# Patient Record
Sex: FEMALE | Race: WHITE | NOT HISPANIC OR LATINO | Employment: FULL TIME | URBAN - METROPOLITAN AREA
[De-identification: names, ages, dates, MRNs, and addresses within clinical notes are randomized per-mention and may not be internally consistent; named-entity substitution may affect disease eponyms.]

---

## 2024-06-26 ENCOUNTER — TELEPHONE (OUTPATIENT)
Dept: OBSTETRICS AND GYNECOLOGY | Facility: CLINIC | Age: 54
End: 2024-06-26
Payer: COMMERCIAL

## 2024-06-26 NOTE — TELEPHONE ENCOUNTER
----- Message from Kitty Esteban sent at 6/26/2024  3:20 PM CDT -----  Regarding: Appointment  Contact: patient  Per phone call with patient, she stated that she would like to schedule an appointment with the physician to establish care.  Please return call at [phone] (home).    Thanks,  SJ

## 2024-06-28 ENCOUNTER — OFFICE VISIT (OUTPATIENT)
Dept: OBSTETRICS AND GYNECOLOGY | Facility: CLINIC | Age: 54
End: 2024-06-28
Payer: COMMERCIAL

## 2024-06-28 VITALS
SYSTOLIC BLOOD PRESSURE: 119 MMHG | BODY MASS INDEX: 27.94 KG/M2 | WEIGHT: 148 LBS | HEIGHT: 61 IN | DIASTOLIC BLOOD PRESSURE: 76 MMHG | HEART RATE: 94 BPM

## 2024-06-28 DIAGNOSIS — A60.9 HSV (HERPES SIMPLEX VIRUS) ANOGENITAL INFECTION: Primary | ICD-10-CM

## 2024-06-28 DIAGNOSIS — N90.7 LABIAL CYST: ICD-10-CM

## 2024-06-28 DIAGNOSIS — R35.0 URINARY FREQUENCY: ICD-10-CM

## 2024-06-28 LAB
BILIRUB SERPL-MCNC: NEGATIVE MG/DL
BLOOD URINE, POC: NEGATIVE
CLARITY, POC UA: CLEAR
COLOR, POC UA: COLORLESS
GLUCOSE UR QL STRIP: NEGATIVE
KETONES UR QL STRIP: NEGATIVE
LEUKOCYTE ESTERASE URINE, POC: NEGATIVE
NITRITE, POC UA: NEGATIVE
PH, POC UA: 6.5
PROTEIN, POC: NEGATIVE
SPECIFIC GRAVITY, POC UA: <=1.005
UROBILINOGEN, POC UA: 0.2

## 2024-06-28 RX ORDER — CLOTRIMAZOLE AND BETAMETHASONE DIPROPIONATE 10; .64 MG/G; MG/G
CREAM TOPICAL 2 TIMES DAILY
Qty: 45 G | Refills: 0 | Status: SHIPPED | OUTPATIENT
Start: 2024-06-28 | End: 2024-07-05

## 2024-06-28 RX ORDER — MUPIROCIN 20 MG/G
OINTMENT TOPICAL 2 TIMES DAILY
COMMUNITY
Start: 2024-06-07

## 2024-06-28 RX ORDER — VALACYCLOVIR HYDROCHLORIDE 500 MG/1
500 TABLET, FILM COATED ORAL
COMMUNITY
Start: 2024-06-20 | End: 2024-06-28 | Stop reason: SDUPTHER

## 2024-06-28 RX ORDER — CLOBETASOL PROPIONATE 0.5 MG/G
AEROSOL, FOAM TOPICAL
COMMUNITY
Start: 2024-02-09

## 2024-06-28 RX ORDER — PROGESTERONE 100 MG/1
CAPSULE ORAL
COMMUNITY
Start: 2024-06-26

## 2024-06-28 RX ORDER — PANTOPRAZOLE SODIUM 40 MG/1
TABLET, DELAYED RELEASE ORAL
COMMUNITY
Start: 2024-05-26

## 2024-06-28 RX ORDER — SULFAMETHOXAZOLE AND TRIMETHOPRIM 800; 160 MG/1; MG/1
60 TABLET ORAL
COMMUNITY
Start: 2024-06-07

## 2024-06-28 RX ORDER — CLINDAMYCIN HYDROCHLORIDE 300 MG/1
300 CAPSULE ORAL 3 TIMES DAILY
Qty: 21 CAPSULE | Refills: 0 | Status: SHIPPED | OUTPATIENT
Start: 2024-06-28 | End: 2024-07-05

## 2024-06-28 RX ORDER — VALACYCLOVIR HYDROCHLORIDE 500 MG/1
500 TABLET, FILM COATED ORAL DAILY
Qty: 90 TABLET | Refills: 3 | Status: SHIPPED | OUTPATIENT
Start: 2024-06-28 | End: 2025-06-28

## 2024-06-28 NOTE — PROGRESS NOTES
"Subjective     Patient ID: Shireen Haas is a 54 y.o. female.    Chief Complaint:  Urinary Frequency      History of Present Illness  Urinary Frequency   Associated symptoms include frequency. Pertinent negatives include no chills, flank pain, hematuria, nausea, urgency, vomiting, constipation or rash.     Patient presents to the clinic today to establish care.  She does report that today she has been experiencing urinary frequency without burning.  She reports voiding 8 times so far today.  She denies back pain, dysuria, hematuria, or fever.  She reports that she does have a history of HSV and has recently been going through stress with her son leaving for the  and has had a breakout.  She is currently being treated and would like to get back on suppressive therapy.  Patient also reports that a few days ago she noticed an area of irritation on the external labia that felt swollen and had a blackhead on it.  It is substantially smaller today but she would like to have it checked out.  She will be due for her wellness exam this September.    Outpatient Medications Marked as Taking for the 6/28/24 encounter (Office Visit) with Rekha Polanco NP   Medication Sig Dispense Refill    ARMOUR THYROID 60 mg Tab Take 60 mg by mouth.      clobetasoL (OLUX) 0.05 % Foam       L-LYSINE ORAL Take by mouth.      mupirocin (BACTROBAN) 2 % ointment 2 (two) times daily. Apply to affected area      pantoprazole (PROTONIX) 40 MG tablet TAKE 1 TABLET BY MOUTH ONCE A DAY TAKE ON EMPTY STOMACH 30 MINUTES PRIOR TO FIRST MEAL OF THE DAY      progesterone (PROMETRIUM) 100 MG capsule       testosterone 100 mg pellet Inject 100 mg into the muscle.      [DISCONTINUED] valACYclovir (VALTREX) 500 MG tablet Take 500 mg by mouth.       Vitals:    06/28/24 0958   BP: 119/76   Pulse: 94   Weight: 67.1 kg (148 lb)   Height: 5' 1" (1.549 m)     Past Medical History:   Diagnosis Date    Hyperlipidemia     Hereditary    Hypothyroid      Past " Surgical History:   Procedure Laterality Date     SECTION      x3    ENDOMETRIAL ABLATION         GYN & OB History  No LMP recorded. Patient has had an ablation.   Date of Last Pap: No result found    OB History    Para Term  AB Living   3 3 3     3   SAB IAB Ectopic Multiple Live Births           3      # Outcome Date GA Lbr Tito/2nd Weight Sex Type Anes PTL Lv   3 Term      CS-LTranv   RENETTA   2 Term      CS-LTranv   RENETTA   1 Term      CS-LTranv   RENETTA       Review of Systems  Review of Systems   Constitutional:  Negative for activity change, appetite change, chills, fatigue and fever.   HENT:  Negative for nasal congestion and tinnitus.    Eyes:  Negative for visual disturbance.   Respiratory:  Negative for cough and shortness of breath.    Cardiovascular:  Negative for chest pain and palpitations.   Gastrointestinal:  Negative for abdominal pain, bloating, blood in stool, constipation, nausea and vomiting.   Endocrine: Negative for diabetes, hair loss and hot flashes.   Genitourinary:  Positive for frequency and vaginal pain (external). Negative for bladder incontinence, decreased libido, dysmenorrhea, dyspareunia, dysuria, flank pain, genital sores, hematuria, hot flashes, menorrhagia, menstrual problem, pelvic pain, urgency, vaginal bleeding, vaginal discharge, urinary incontinence, postcoital bleeding, postmenopausal bleeding, vaginal dryness and vaginal odor.   Musculoskeletal:  Negative for arthralgias, back pain, leg pain and myalgias.   Integumentary:  Negative for rash, acne, hair changes, mole/lesion, breast mass, nipple discharge, breast skin changes and breast tenderness.   Neurological:  Negative for vertigo, syncope, numbness and headaches.   Hematological:  Does not bruise/bleed easily.   Psychiatric/Behavioral:  Negative for depression and sleep disturbance. The patient is not nervous/anxious.    Breast: Negative for asymmetry, lump, mass, mastodynia, nipple discharge, skin  changes and tenderness         Objective   Physical Exam:   Constitutional: She is oriented to person, place, and time. Vital signs are normal. She appears well-developed and well-nourished.    HENT:   Head: Normocephalic.   Nose: No epistaxis.    Eyes: Lids are normal.    Neck: Trachea normal.    Cardiovascular:  Normal rate, regular rhythm and normal heart sounds.             Pulmonary/Chest: Effort normal and breath sounds normal. Right breast exhibits no mass, no skin change, no tenderness and no swelling. Left breast exhibits no mass, no skin change, no tenderness and no swelling. Breasts are symmetrical.        Abdominal: Soft.     Genitourinary:    Vagina, uterus and rectum normal.            Pelvic exam was performed with patient supine.     Labial bartholins normal.Cervix is normal. Right adnexum displays no mass and no tenderness. Left adnexum displays no mass and no tenderness. No erythema or vaginal discharge in the vagina.           Musculoskeletal: Normal range of motion and moves all extremeties.      Lymphadenopathy:     She has no cervical adenopathy.    Neurological: She is alert and oriented to person, place, and time.    Skin: Skin is warm and dry.    Psychiatric: She has a normal mood and affect. Her speech is normal and behavior is normal. Judgment and thought content normal.      Female chaperone present for exam          Assessment and Plan     1. HSV (herpes simplex virus) anogenital infection    2. Urinary frequency    3. Labial cyst             Plan:  HSV (herpes simplex virus) anogenital infection  -     valACYclovir (VALTREX) 500 MG tablet; Take 1 tablet (500 mg total) by mouth once daily.  Dispense: 90 tablet; Refill: 3    Urinary frequency  -     POCT urine dipstick without microscope  Neg UA    Labial cyst  -     clindamycin (CLEOCIN) 300 MG capsule; Take 1 capsule (300 mg total) by mouth 3 (three) times daily. for 7 days  Dispense: 21 capsule; Refill: 0  Monitor for worsening  symptoms of infection such as pus, fever, redness, swelling, pain.  Keep area clean and dry.  Please notify office if it becomes enlarged.    Other orders  -     clotrimazole-betamethasone 1-0.05% (LOTRISONE) cream; Apply topically 2 (two) times daily. for 7 days  Dispense: 45 g; Refill: 0    Follow up in about 3 months (around 9/28/2024).  For wellness

## 2024-07-16 ENCOUNTER — PATIENT MESSAGE (OUTPATIENT)
Dept: OBSTETRICS AND GYNECOLOGY | Facility: CLINIC | Age: 54
End: 2024-07-16
Payer: COMMERCIAL

## 2024-08-22 ENCOUNTER — TELEPHONE (OUTPATIENT)
Dept: OBSTETRICS AND GYNECOLOGY | Facility: CLINIC | Age: 54
End: 2024-08-22
Payer: COMMERCIAL

## 2024-08-22 NOTE — TELEPHONE ENCOUNTER
----- Message from Katt Chopra sent at 8/22/2024 10:58 AM CDT -----  Regarding: appt concerns  Name of who is calling:   Shireen      What is the request in detail: Pt is requesting a call back in ref to rescheduling 09/16/2024 appt      Can the clinic reply by [facility]:no      What number to call back if not [facility]: [phone]

## 2025-03-04 ENCOUNTER — OFFICE VISIT (OUTPATIENT)
Dept: OBSTETRICS AND GYNECOLOGY | Facility: CLINIC | Age: 55
End: 2025-03-04
Payer: COMMERCIAL

## 2025-03-04 VITALS
BODY MASS INDEX: 29.23 KG/M2 | WEIGHT: 154.81 LBS | SYSTOLIC BLOOD PRESSURE: 121 MMHG | DIASTOLIC BLOOD PRESSURE: 82 MMHG | HEART RATE: 86 BPM | HEIGHT: 61 IN

## 2025-03-04 DIAGNOSIS — Z13.820 SCREENING FOR OSTEOPOROSIS: ICD-10-CM

## 2025-03-04 DIAGNOSIS — K64.4 EXTERNAL HEMORRHOID: ICD-10-CM

## 2025-03-04 DIAGNOSIS — Z12.31 ENCOUNTER FOR SCREENING MAMMOGRAM FOR MALIGNANT NEOPLASM OF BREAST: ICD-10-CM

## 2025-03-04 DIAGNOSIS — Z01.419 GYNECOLOGIC EXAM NORMAL: Primary | ICD-10-CM

## 2025-03-04 PROCEDURE — 99396 PREV VISIT EST AGE 40-64: CPT | Mod: S$PBB,,, | Performed by: NURSE PRACTITIONER

## 2025-03-04 RX ORDER — HYDROCORTISONE 25 MG/G
CREAM TOPICAL 2 TIMES DAILY
Qty: 30 G | Refills: 1 | Status: SHIPPED | OUTPATIENT
Start: 2025-03-04

## 2025-03-04 NOTE — PROGRESS NOTES
"Subjective     Patient ID: Shireen Haas is a 54 y.o. female.    Chief Complaint:  Well Woman (Pt has no c/o )      History of Present Illness  HPI  Annual Exam-Postmenopausal  Patient presents for annual exam. The patient has no complaints today. The patient is sexually active. GYN screening history: last pap: was normal and last mammogram: was normal. The patient is taking hormone replacement therapy (testosterone pellets with Dr Hollis). Patient denies post-menopausal vaginal bleeding.  Reports that she is doing relatively well.  She did experience a terrible bout of constipation a couple of weeks ago and with all the straining she ended up having a flare up of her external hemorrhoids.  She denies vaginal discharge, dryness, painful intercourse.  She denies chest pains, palpitations, shortness for breath.  She was taking the progesterone when she was with Nor1 however she has since stopped taking the progesterone and is only currently doing the testosterone pellets.    Outpatient Medications Marked as Taking for the 3/4/25 encounter (Office Visit) with Rekha Polanco NP   Medication Sig Dispense Refill    ARMOUR THYROID 60 mg Tab Take 60 mg by mouth.      clobetasoL (OLUX) 0.05 % Foam       pantoprazole (PROTONIX) 40 MG tablet TAKE 1 TABLET BY MOUTH ONCE A DAY TAKE ON EMPTY STOMACH 30 MINUTES PRIOR TO FIRST MEAL OF THE DAY      testosterone 100 mg pellet Inject 100 mg into the muscle.      valACYclovir (VALTREX) 500 MG tablet Take 1 tablet (500 mg total) by mouth once daily. 90 tablet 3     Vitals:    25 0825   BP: 121/82   BP Location: Left forearm   Patient Position: Sitting   Pulse: 86   Weight: 70.2 kg (154 lb 12.8 oz)   Height: 5' 1" (1.549 m)     Past Medical History:   Diagnosis Date    Endometriosis of uterus     Had uteral ablasion and endometreosois removed    Hyperlipidemia     Hereditary    Hypothyroid      Past Surgical History:   Procedure Laterality Date     " SECTION      x3    ENDOMETRIAL ABLATION           GYN & OB History  No LMP recorded. Patient has had an ablation.   Date of Last Pap: No result found    OB History    Para Term  AB Living   3 3 3   3   SAB IAB Ectopic Multiple Live Births       3      # Outcome Date GA Lbr Tito/2nd Weight Sex Type Anes PTL Lv   3 Term      CS-LTranv   RENETTA   2 Term      CS-LTranv   RENETTA   1 Term      CS-LTranv   RENETTA       Review of Systems  Review of Systems   Constitutional:  Negative for activity change, appetite change, chills, fatigue and fever.   HENT:  Negative for nasal congestion and tinnitus.    Eyes:  Negative for visual disturbance.   Respiratory:  Negative for cough and shortness of breath.    Cardiovascular:  Negative for chest pain and palpitations.   Gastrointestinal:  Positive for constipation. Negative for abdominal pain, bloating, blood in stool, nausea and vomiting.        External hemorrhoids    Endocrine: Negative for diabetes, hair loss and hot flashes.   Genitourinary:  Negative for bladder incontinence, decreased libido, dysmenorrhea, dyspareunia, dysuria, flank pain, frequency, genital sores, hematuria, hot flashes, menorrhagia, menstrual problem, pelvic pain, urgency, vaginal bleeding, vaginal discharge, vaginal pain, urinary incontinence, postcoital bleeding, postmenopausal bleeding, vaginal dryness and vaginal odor.   Musculoskeletal:  Negative for arthralgias, back pain, leg pain and myalgias.   Integumentary:  Negative for rash, acne, hair changes, mole/lesion, breast mass, nipple discharge, breast skin changes and breast tenderness.   Neurological:  Negative for vertigo, syncope, numbness and headaches.   Hematological:  Does not bruise/bleed easily.   Psychiatric/Behavioral:  Negative for depression and sleep disturbance. The patient is not nervous/anxious.    Breast: Negative for asymmetry, lump, mass, mastodynia, nipple discharge, skin changes and tenderness         Objective   Physical  Exam:   Constitutional: She is oriented to person, place, and time. Vital signs are normal. She appears well-developed and well-nourished.    HENT:   Head: Normocephalic.   Nose: No epistaxis.    Eyes: Lids are normal.    Neck: Trachea normal.    Cardiovascular:  Normal rate, regular rhythm and normal heart sounds.             Pulmonary/Chest: Effort normal and breath sounds normal. Right breast exhibits no mass, no skin change, no tenderness and no swelling. Left breast exhibits no mass, no skin change, no tenderness and no swelling. Breasts are symmetrical.        Abdominal: Soft.     Genitourinary:    Vagina and uterus normal.   Rectum:      External hemorrhoid present.      Pelvic exam was performed with patient supine.   The external female genitalia was normal.     Labial bartholins normal.Cervix is normal. Right adnexum displays no mass and no tenderness. Left adnexum displays no mass and no tenderness. No erythema or vaginal discharge in the vagina.    Genitourinary Comments: Female chaperone present for exam               Musculoskeletal: Normal range of motion and moves all extremeties.      Lymphadenopathy:     She has no cervical adenopathy.    Neurological: She is alert and oriented to person, place, and time.    Skin: Skin is warm and dry.    Psychiatric: She has a normal mood and affect. Her speech is normal and behavior is normal. Judgment and thought content normal.            Assessment and Plan     1. Gynecologic exam normal    2. Encounter for screening mammogram for malignant neoplasm of breast    3. Screening for osteoporosis    4. External hemorrhoid             Plan:  Gynecologic exam normal  Patient was counseled today on A.C.S. Pap guidelines and recommendations for yearly pelvic exams, mammograms and monthly self breast exams; to see her PCP for other health maintenance.     Encounter for screening mammogram for malignant neoplasm of breast  -     Mammo Digital Screening Bilat w/ Pa  (XPD); Future; Expected date: 03/04/2025    Screening for osteoporosis  -     DXA Bone Density Axial Skeleton 1 or more sites; Future; Expected date: 03/04/2025  The following methods for promoting bone health & decreasing the risk for osteoporosis were discussed with the patient:  Engaging in weight bearing exercises most days of the week  Calcium and vitamin D supplementation  Decreasing caffeine intake  Avoiding alcohol consumption  Smoking cessation, if necessary     External hemorrhoid  -     hydrocortisone 2.5 % cream; Apply topically 2 (two) times daily. Please add lidocaine 5%-aka rectal relief cream  Dispense: 30 g; Refill: 1  Discussed with the patient to try and avoid constipation by taking a daily supplements such as MiraLax.  We also discussed avoiding straining as well as sitting or standing too long.  Encouraged to increase water intake as well as fiber.  Patient verbalized understanding.    We also discussed that if she is not having any vasomotor symptoms of menopause and does not feel that she needs estrogen that she is fine just on the testosterone.  Also discussed that she does not need progesterone if she is not on the estrogen.  If she begins to experience menopausal symptoms such as hot flashes, night sweats, mood swings etcetera we can discuss estrogen replacement therapy as well as progesterone.    Follow up in about 1 year (around 3/4/2026).

## 2025-07-31 ENCOUNTER — OFFICE VISIT (OUTPATIENT)
Dept: OBSTETRICS AND GYNECOLOGY | Facility: CLINIC | Age: 55
End: 2025-07-31
Payer: COMMERCIAL

## 2025-07-31 VITALS
DIASTOLIC BLOOD PRESSURE: 82 MMHG | HEIGHT: 61 IN | BODY MASS INDEX: 28.89 KG/M2 | HEART RATE: 78 BPM | SYSTOLIC BLOOD PRESSURE: 125 MMHG | WEIGHT: 153 LBS

## 2025-07-31 DIAGNOSIS — N89.8 VAGINAL LEUKORRHEA: ICD-10-CM

## 2025-07-31 DIAGNOSIS — B37.2 YEAST INFECTION OF THE SKIN: ICD-10-CM

## 2025-07-31 DIAGNOSIS — N90.7 LABIAL CYST: Primary | ICD-10-CM

## 2025-07-31 PROCEDURE — 99214 OFFICE O/P EST MOD 30 MIN: CPT | Mod: S$PBB,,, | Performed by: NURSE PRACTITIONER

## 2025-07-31 RX ORDER — CLINDAMYCIN HYDROCHLORIDE 300 MG/1
300 CAPSULE ORAL 3 TIMES DAILY
Qty: 21 CAPSULE | Refills: 0 | Status: SHIPPED | OUTPATIENT
Start: 2025-07-31 | End: 2025-08-07

## 2025-07-31 RX ORDER — CLOTRIMAZOLE AND BETAMETHASONE DIPROPIONATE 10; .64 MG/G; MG/G
CREAM TOPICAL 2 TIMES DAILY
Qty: 45 G | Refills: 0 | Status: SHIPPED | OUTPATIENT
Start: 2025-07-31 | End: 2025-08-07

## 2025-07-31 RX ORDER — ONDANSETRON 4 MG/1
TABLET, ORALLY DISINTEGRATING ORAL
COMMUNITY
Start: 2025-03-17

## 2025-07-31 NOTE — PROGRESS NOTES
"Subjective     Patient ID: Shireen Haas is a 55 y.o. female.    Chief Complaint:  Gynecologic Exam (Pt is concerned with HSV that seems to not want to go away and vaginal itching.)      History of Present Illness  Gynecologic Exam  The patient's pertinent negatives include no pelvic pain or vaginal discharge. Pertinent negatives include no abdominal pain, back pain, chills, constipation, dysuria, fever, flank pain, frequency, headaches, hematuria, nausea, rash, urgency or vomiting. There is no history of menorrhagia.     Patient presents to the clinic for a vaginal exam.  She reports that she feels like she has had an HSV lesion for the past 3 weeks that will not go away.  She is also experiencing external vaginal itching.  She denies vaginal discharge or vaginal odor.  She does have a history of HSV for quite some time now.    Outpatient Medications Marked as Taking for the 25 encounter (Office Visit) with Rekha Polanco NP   Medication Sig Dispense Refill    ARMOUR THYROID 60 mg Tab Take 60 mg by mouth.      clobetasoL (OLUX) 0.05 % Foam       ondansetron (ZOFRAN-ODT) 4 MG TbDL TAKE 1 TABLET BY MOUTH EVERY EIGHT HOURS AS NEEDED FOR NAUSEA/VOMITING FOR NAUSEA      testosterone 100 mg pellet Inject 100 mg into the muscle.       Vitals:    25 1559   BP: 125/82   Pulse: 78   Weight: 69.4 kg (153 lb)   Height: 5' 1" (1.549 m)     Past Medical History:   Diagnosis Date    Endometriosis of uterus     Had uteral ablasion and endometreosois removed    Hyperlipidemia     Hereditary    Hypothyroid      Past Surgical History:   Procedure Laterality Date     SECTION      x3    ENDOMETRIAL ABLATION         GYN & OB History  No LMP recorded. Patient has had an ablation.   Date of Last Pap: No result found    OB History    Para Term  AB Living   3 3 3   3   SAB IAB Ectopic Multiple Live Births       3      # Outcome Date GA Lbr Tito/2nd Weight Sex Type Anes PTL Lv   3 Term      CS-LTranv   " RENETTA   2 Term      CS-LTranv   RENETTA   1 Term      CS-LTranv   RENETTA       Review of Systems  Review of Systems   Constitutional:  Negative for activity change, appetite change, chills, fatigue and fever.   HENT:  Negative for nasal congestion and tinnitus.    Eyes:  Negative for visual disturbance.   Respiratory:  Negative for cough and shortness of breath.    Cardiovascular:  Negative for chest pain and palpitations.   Gastrointestinal:  Negative for abdominal pain, bloating, blood in stool, constipation, nausea and vomiting.   Endocrine: Negative for diabetes, hair loss and hot flashes.   Genitourinary:  Positive for vaginal pain (possible HSV outbreak). Negative for bladder incontinence, decreased libido, dysmenorrhea, dyspareunia, dysuria, flank pain, frequency, genital sores, hematuria, hot flashes, menorrhagia, menstrual problem, pelvic pain, urgency, vaginal bleeding, vaginal discharge, urinary incontinence, postcoital bleeding, postmenopausal bleeding, vaginal dryness and vaginal odor.          Vaginal itching   Musculoskeletal:  Negative for arthralgias, back pain, leg pain and myalgias.   Integumentary:  Negative for rash, acne, hair changes, mole/lesion, breast mass, nipple discharge, breast skin changes and breast tenderness.   Neurological:  Negative for vertigo, syncope, numbness and headaches.   Hematological:  Does not bruise/bleed easily.   Psychiatric/Behavioral:  Negative for depression and sleep disturbance. The patient is not nervous/anxious.    Breast: Negative for asymmetry, lump, mass, mastodynia, nipple discharge, skin changes and tenderness         Objective   Physical Exam:    HENT:   Nose: No epistaxis.              Genitourinary:    Inguinal canal, urethra, bladder, uterus, right adnexa, left adnexa and rectum normal.            Pelvic exam was performed with patient supine.   The external female genitalia was normal.     Labial bartholins normal.Cervix is normal. There is vaginal discharge  (copious, white, swab obtained.) in the vagina. Normal urethral meatus.   Genitourinary Comments: Female chaperone present for exam                              Assessment and Plan     1. Labial cyst    2. Vaginal leukorrhea    3. Yeast infection of the skin             Plan:  Labial cyst  -     clindamycin (CLEOCIN) 300 MG capsule; Take 1 capsule (300 mg total) by mouth 3 (three) times daily. for 7 days  Dispense: 21 capsule; Refill: 0    Patient was instructed to reach out to me should the area not clear up and we can schedule her for a possible incision and drainage versus biopsy    Vaginal leukorrhea  -     Fungus culture; Future; Expected date: 07/31/2025  -     Vaginosis Screen by DNA Probe; Future; Expected date: 07/31/2025   We will treat swab if needed.    Yeast infection of the skin  -     clotrimazole-betamethasone 1-0.05% (LOTRISONE) cream; Apply topically 2 (two) times daily. for 7 days  Dispense: 45 g; Refill: 0     Follow up if symptoms worsen or fail to improve.

## 2025-08-07 ENCOUNTER — PATIENT MESSAGE (OUTPATIENT)
Dept: OBSTETRICS AND GYNECOLOGY | Facility: CLINIC | Age: 55
End: 2025-08-07
Payer: COMMERCIAL

## 2025-08-07 DIAGNOSIS — N89.8 VAGINAL LEUKORRHEA: Primary | ICD-10-CM

## 2025-08-07 DIAGNOSIS — A60.9 HSV (HERPES SIMPLEX VIRUS) ANOGENITAL INFECTION: ICD-10-CM

## 2025-08-07 RX ORDER — VALACYCLOVIR HYDROCHLORIDE 500 MG/1
500 TABLET, FILM COATED ORAL
Qty: 90 TABLET | Refills: 3 | Status: SHIPPED | OUTPATIENT
Start: 2025-08-07

## 2025-08-07 RX ORDER — TINIDAZOLE 500 MG/1
2 TABLET ORAL
Qty: 8 TABLET | Refills: 0 | Status: SHIPPED | OUTPATIENT
Start: 2025-08-07 | End: 2025-08-09

## 2025-08-18 ENCOUNTER — PATIENT MESSAGE (OUTPATIENT)
Dept: OBSTETRICS AND GYNECOLOGY | Facility: CLINIC | Age: 55
End: 2025-08-18
Payer: COMMERCIAL

## 2025-08-22 ENCOUNTER — OFFICE VISIT (OUTPATIENT)
Dept: OBSTETRICS AND GYNECOLOGY | Facility: CLINIC | Age: 55
End: 2025-08-22
Payer: COMMERCIAL

## 2025-08-22 DIAGNOSIS — N90.7 LABIAL CYST: ICD-10-CM

## 2025-08-22 DIAGNOSIS — N95.2 ATROPHIC VAGINITIS: Primary | ICD-10-CM

## 2025-08-22 RX ORDER — ESTRADIOL 0.1 MG/G
1 CREAM VAGINAL
Qty: 42.5 G | Refills: 1 | Status: SHIPPED | OUTPATIENT
Start: 2025-08-25 | End: 2026-08-25